# Patient Record
Sex: FEMALE | Race: BLACK OR AFRICAN AMERICAN
[De-identification: names, ages, dates, MRNs, and addresses within clinical notes are randomized per-mention and may not be internally consistent; named-entity substitution may affect disease eponyms.]

---

## 2018-11-08 NOTE — PDOC.LDHP
Labor and Delivery H&P


Chief complaint: other (BP workup)


HPI: 





23 y/o G1 at 27w0d, patient of Dr. Camacho, presents from clinic for PIH workup.

  Patient has CHTN not requiring medications but BPs were higher today than 

they had been.  Denies VB, LOF, ctx, PIH sx, or decreased FM.





ROS neg for HEENT, cv, pulm, gi, gu, neuro, psych, skin, musculoskeletal or 

constitutional symptoms other than mentioned above.


OB History Details: 





First pregnancy


Current pregnancy complications: hypertension, other (clubbed foot)


Past Medical History: 





CHTN, morbid obesity


Current medications: pre- vitamins


Previous surgical history: none


Allergies/Adverse Reactions: 


 Allergies











Allergy/AdvReac Type Severity Reaction Status Date / Time


 


No Known Allergies Allergy   Unverified 18 14:00











Social history: none





- Physical Exam


Abnormal vital signs: mild range BPs with single BP in 160s when initially 

arrived


General: NAD, resting


Lungs: nonlabored breathing


Abdomen: gravid


Extremeties: no edema


FHT: category 1 (140s, mod variability, + accels, no decels)


Gargatha contractions every: none





- OB Labs


Additional Labs: 





 Laboratory Tests











  18





  14:16 14:16 15:10


 


WBC   8.5 


 


RBC   3.72 L 


 


Hgb   10.5 L 


 


Hct   32.9 L 


 


MCV   88.5 


 


MCH   28.3 


 


MCHC   32.0 


 


RDW   13.4 


 


Plt Count   308 


 


MPV   7.4 


 


Neutrophils %   71.3 


 


Lymphocytes %   21.0 


 


Monocytes %   5.7 


 


Eosinophils %   1.1 


 


Basophils %   0.9 


 


Neutrophils #   6.0 


 


Lymphocytes #   1.8 


 


Monocytes #   0.5 


 


Eosinophils #   0.1 


 


Basophils #   0.1 


 


Sodium  134 L  


 


Potassium  4.0  


 


Chloride  105  


 


Carbon Dioxide  24  


 


Anion Gap  9 L  


 


BUN  8  


 


Creatinine  0.64  


 


Estimated GFR (MDRD)  Greater than  90  


 


Glucose  71  


 


Uric Acid  3.9  


 


Calcium  9.2  


 


Total Bilirubin  0.2  


 


AST  19  


 


ALT  20  


 


Alkaline Phosphatase  52  


 


Serum Total Protein  7.1  


 


Albumin  3.8  


 


Globulin  3.3  


 


Albumin/Globulin Ratio  1.2  


 


U Random Total Protein    16 H


 


Urine Creatinine    143.80 H














- Assessment





23 y/o G1 at 27w0d with no e/o Preeclampsia.  Labs wnl.  Fetal status 

reassuring with reactive NST.





- Plan


-: 





D/c home with precautions.  Patient to get BP check tomorrow and call Dr. Camacho

's office to report and schedule an appointment.

## 2018-11-12 NOTE — PDOC.LDPN
Labor & Delivery Progress Note





- Subjective


Subjective: no concerns (No PIH symptoms. Good fetal movement.)





- Objective


Vital signs reviewed and normal: yes


Uterine fundus: non tender


FHT: category 1





- Assessment


(1) Gestational hypertension


Code(s): O13.9 - GESTATIONAL HTN W/O SIGNIFICANT PROTEINURIA, UNSP TRIMESTER   

Current Visit: Yes   Status: Acute   


Qualifiers: 


   Trimester: second trimester   Qualified Code(s): O13.2 - Gestational [

pregnancy-induced] hypertension without significant proteinuria, second 

trimester   


Plan: continue plan of care (Labs and spot urine protein remain normal. NST 

resassurring. Initial blood pressures in early first timester were borderline 

inicating chronic issue. Procardia 30 mg xl intiaited yesterday and blood 

pressures have normalized. Will transfer to floor to complete 24 hour urine 

protein. Administer 12 mg betamethasone this AM and tomorrow in case pre term 

delivery indicated.)

## 2018-11-12 NOTE — HP
DATE OF ADMISSION:  11/11/2018

 

PRIMARY OB:  Dr. Camacho.

 

CHIEF COMPLAINT:  Elevated blood pressures.

 

HISTORY OF PRESENT ILLNESS:  The patient is a 22-year-old G1, P0 female with an intrauterine pregnanc
y at 27 weeks, who is representing to Labor and Delivery with concerns of elevated blood pressure at 
home.  Over the last several days, she has been noted to have elevated pressures in the clinic and ha
s been evaluated here at the hospital 3 days prior for the same complaint.  The patient reports at [facility], she has been getting blood pressures in the 160s with a home monitoring cuff.  She denies headach
e, chest pain, shortness of breath.  She denies abdominal pains.  She denies contractions, vaginal bl
eeding, or urinary symptoms.  In discussing the patient's history, the patient does not have any form
al diagnosis of chronic hypertension, reports that her blood pressures have all been normal up until 
recent history with her pregnancy; however, the patient does also report that she was told she has ha
d elevated blood pressures on occasion when she would give plasma prior to the pregnancy.  She has be
en unable to communicate how high her pressures have been, only that she has been told that they were
 high.

 

PAST MEDICAL HISTORY:  Morbid obesity.

 

ALLERGIES:  No known drug allergies.

 

SOCIAL HISTORY:  Denies drug, alcohol or tobacco use.

 

MEDICATIONS:  Prenatal vitamins.

 

OB LABORATORY DATA:  Unavailable at the time of dictation.

 

REVIEW OF SYSTEMS:  Per HPI.

 

PHYSICAL EXAMINATION:

VITAL SIGNS:  Initial blood pressure on arrival was 138/81.  Patient has had a couple of severe range
 pressures as high as 178/83, pulse in the 80s and 70s, respiratory rate 16-20, temperature 98.2.

GENERAL:  She appears to be in no acute distress.  She is alert and oriented, cooperative and pleasan
t to interact with.

HEAD:  Normocephalic, atraumatic.

LUNGS:  Clear to auscultation bilaterally.

HEART:  Has a regular rate and rhythm.

ABDOMEN:  Soft and obese.

EXTREMITIES:  Nontender, nonedematous.

 

LABORATORY DATA:  Show a white count of 8.7, hemoglobin 10.9, hematocrit 31.1, platelets of 279,000. 
 AST of 16, ALT of 19.  Uric acid of 4.3, creatinine 0.67 and urine to creatinine ratio of less than 
0.1.

 

The fetus shows category 1 tracing with a baseline in the 140s with moderate long-term variability an
d positive 15 x 15 accelerations.

 

ASSESSMENT AND PLAN:  The patient is a 22-year-old female with morbid obesity and possible chronic hy
pertension who presented with elevated blood pressures.  During her initial workup here, the patient 
is noted to have some severe range pressures that may or may not be related to technique and difficul
ty due to her habitus; however, the patient has had repeated mild range pressures and isolated severe
 range pressures during her stay.  The patient has been admitted for prolonged blood pressure monitor
ing, 24-hour urine protein collection and for better understanding of this problem.  Her primary OB, 
Dr. Camacho has been notified of Ms. Chan's current status and will be managing her care.

## 2018-11-13 NOTE — PDOC.EVN
Event Note





- Event Note


Event Note: 





Good fetal movement. No PIH symptoms. 


O: 111-120/67 on procardia 30 mg XL/d


24 hour urine protein was negative 10 mg.


abdomen soft and non tender.


A/P: 27 5/7 weeks--most likely chronic hypertension with superimposed mild 

pih... urine protein was negative. Labs normal.


Continue procardia 30 mg xl/day. Offic echecks twice a week. Labs weekly and 

serial interval growth u/s q 3 weeks. 


Complete betamethasone this AM.

## 2019-01-06 ENCOUNTER — HOSPITAL ENCOUNTER (OUTPATIENT)
Dept: HOSPITAL 92 - L&D/OP | Age: 24
Discharge: HOME | End: 2019-01-06
Attending: OBSTETRICS & GYNECOLOGY
Payer: COMMERCIAL

## 2019-01-06 VITALS — TEMPERATURE: 98.9 F | SYSTOLIC BLOOD PRESSURE: 127 MMHG | DIASTOLIC BLOOD PRESSURE: 82 MMHG

## 2019-01-06 VITALS — BODY MASS INDEX: 63.6 KG/M2

## 2019-01-06 DIAGNOSIS — O99.213: ICD-10-CM

## 2019-01-06 DIAGNOSIS — Z3A.35: ICD-10-CM

## 2019-01-06 DIAGNOSIS — O47.03: Primary | ICD-10-CM

## 2019-01-06 DIAGNOSIS — M54.5: ICD-10-CM

## 2019-01-06 DIAGNOSIS — O10.913: ICD-10-CM

## 2019-01-06 DIAGNOSIS — E66.01: ICD-10-CM

## 2019-01-06 DIAGNOSIS — O99.89: ICD-10-CM

## 2019-01-06 DIAGNOSIS — Z79.899: ICD-10-CM

## 2019-01-06 PROCEDURE — 99283 EMERGENCY DEPT VISIT LOW MDM: CPT

## 2019-01-06 NOTE — PDOC.EVN
Event Note





- Event Note


Event Note: 





S: Feeling well, denies feeling any contractions and tolerating PO hydration 

well.





O: VSS, no elevated BP


Gen: awake, alert, oriented


HEENT: MMM


ABD: gravid, nontender


EXT: no edema





A/P: 22 yo G1 @ 35.3w by LMP/1T sono here with  contractions





1.  contractions


- Spaced out and no longer feeling contractions


- cl/th/hi on exam, has f/u with Dr. Camacho this week


- Return precautions given





2. cHTN


- WNL today, no preE symptoms


- Continue nifedipine





No e/o  labor and BP well controlled, f/u this week with Dr. Camacho





Addendum - Attending





- Attending Attestation


Date/Time: 19





I personally evaluated the patient and discussed the management with Dr. Knapp.


I agree with the Assessment and Plan documented above.

## 2019-01-06 NOTE — PDOC.FPROB
FMR OB H&P: HPI





- History of Present Illness


Chief Complaint: Lower back pain, comes and goes


History of Present Illness: 





Ms. Chan is a 24 yo  at 35.3 by LMP and reported 10w sono who presents 

with cc of low back pain. She reports she was out with friends last night and 

started to notice some intermittent cramping low back pain every 15 minutes or 

so. She went home and laid down and the pain went away. She woke up this 

morning and had 1 more contraction and wanted to come in to get checked out. 

She has been checking her BP multiple times a day and reports it has been 

normal. She denies any headache, vision changes. abdominal pain, swelling, 

vaginal bleeding, LOF. She is feeling baby move regularly.








Primary Care Physician: 





Dr. Camacho





FMR OB H&P: Current Pregnancy





- Prenatal Care


: 1


Para: 0


Gestational age: 35.3


Due date: 2019


Dating Criteria: LMP/10w sono


Course/Complications: 





cHTN, concern for preE earlier in pregnancy s/p BMZ x2 in November


Morbid obesity





- OB Labs


Blood type: unknown


RH: unknown


Antibody Screen: unknown


HIV: unknown


RPR: unknown


HepBsAg: unknown


Quad screen: unknown


Gonorrhea: unknown


Chlamydia: unknown


GBS: unknown





- First Trimester Ultrasound


First trimester: 





Reported 10w





- Anatomy Survey


Anatomy survey: 





WNL per patient, getting frequent growth sonos at Dr. Camacho's office





FMR OB H&P: History





- Past Medical History


PMH: 





cHTN





- OB History


OB History: 





G1





- Surgical History


Sx History: 





Denies





- Social History


Social History: 





Denies t/a/d





- Family History


Family History: 





Denies any history of HTN, DM, pregnancy complications





FMR OB H&P: Medications





- Current


Home Medications: 


 











 Medication  Instructions  Recorded  Confirmed  Type


 


NIFEdipine [Nifedipine ER] 1 capsule PO DAILY 19 History











Allergies/Adverse Reactions: 


 Allergies











Allergy/AdvReac Type Severity Reaction Status Date / Time


 


No Known Allergies Allergy   Unverified 18 14:00














FMR OB H&P: ROS





- Review of Systems


General: denies: fever/chills, weight/appetite/sleep changes


Eyes: denies: eye pain, double vision


ENT: denies: nasal congestion, rhinorrhea


Cardiovascular: denies: edema, orthopnea


Respiratory: denies: cough, congestion


Gastrointestinal: denies: abdominal pain, nausea, vomiting


Genitourinary (Female): denies: dysuria, hematuria


Musculoskeletal: denies: pain, stiffness


Neurologic: denies: syncope, headache


Integumentary: denies: itching, rash


Psychological: denies: depression, anxiety





FMR OB H&P: Vital Signs





- Maternal


Vital signs: 


 Vital Signs - First Documented











Temp Pulse Resp BP


 


 98.9 F   86   18   127/82 


 


 19 13:40  19 13:40  19 13:40  19 13:40














- Fetal Heart Tones


Baseline: 140


Variability: moderate


Acceleration: present


Deceleration: absent


Category: category 1





FMR OB H&P: Physical Exam





- Physical Exam


General: NAD, awake, alert and oriented


HEENT: normocephalic and atraumatic, EOMI


Neck: supple, trachea midline


Heart: RRR, normal S1/S2


General: CTAB, no respiratory distress


Abdomen: soft, gravid


Musculoskeletal: normal gait and station, pulses present


Skin: no rash, good tugor


Psychiatric: intact recent and remote memory, good judgement and insight





- Pelvic Exam


Vulva: normal hair distribution, appropriate carley stage


SVE: closed/thick/high





FMR OB H&P: A/P





- Problem List


(1)  contractions


Status: Acute   Code(s): O47.9 - FALSE LABOR, UNSPECIFIED   





(2) Chronic hypertension affecting pregnancy


Status: Acute   Code(s): O10.919 - UNSP PRE-EXISTING HTN COMP PREGNANCY, UNSP 

TRIMESTER   


Disposition: 





24 yo  at 35.3w by LMP/10w sono here with  contractions without 

cervical change





 1.  contractions


- cl/th/high


- will PO hydrate


- do not suspect  labor but will monitor for 30 minutes to an hour





2. cHTN


- BP all WNL


- No preE symptoms


- Continue daily checks and home meds





Will monitor and likely plan for d/c and f/u with Dr. Camacho this week


Discussion: 


Date/Time: 19 8656





This H&P was discussed with Dr. Celis who agrees with the above documentation 

and plan.








Addendum - Attending





- Attending Attestation


Date/Time: 19 2100





I personally evaluated the patient and discussed the management with Dr. Knapp.


I agree with the History, Examination, Assessment and Plan documented above.

## 2019-01-24 ENCOUNTER — HOSPITAL ENCOUNTER (INPATIENT)
Dept: HOSPITAL 92 - L&D | Age: 24
LOS: 3 days | Discharge: HOME | End: 2019-01-27
Attending: OBSTETRICS & GYNECOLOGY | Admitting: OBSTETRICS & GYNECOLOGY
Payer: COMMERCIAL

## 2019-01-24 VITALS — BODY MASS INDEX: 61.9 KG/M2

## 2019-01-24 DIAGNOSIS — E66.01: ICD-10-CM

## 2019-01-24 DIAGNOSIS — O35.8XX0: ICD-10-CM

## 2019-01-24 DIAGNOSIS — Z3A.38: ICD-10-CM

## 2019-01-24 LAB
ALBUMIN SERPL BCG-MCNC: 3.9 G/DL (ref 3.5–5)
ALP SERPL-CCNC: 105 U/L (ref 40–150)
ALT SERPL W P-5'-P-CCNC: 20 U/L (ref 8–55)
ANION GAP SERPL CALC-SCNC: 15 MMOL/L (ref 10–20)
AST SERPL-CCNC: 17 U/L (ref 5–34)
BILIRUB SERPL-MCNC: 0.3 MG/DL (ref 0.2–1.2)
BUN SERPL-MCNC: 9 MG/DL (ref 7–18.7)
CALCIUM SERPL-MCNC: 10.1 MG/DL (ref 7.8–10.44)
CHLORIDE SERPL-SCNC: 107 MMOL/L (ref 98–107)
CO2 SERPL-SCNC: 19 MMOL/L (ref 22–29)
CREAT CL PREDICTED SERPL C-G-VRATE: 332 ML/MIN (ref 70–130)
GLOBULIN SER CALC-MCNC: 3.7 G/DL (ref 2.4–3.5)
GLUCOSE SERPL-MCNC: 70 MG/DL (ref 70–105)
HBSAG INDEX: 0.32 S/CO (ref 0–0.99)
HGB BLD-MCNC: 11.3 G/DL (ref 12–16)
MCH RBC QN AUTO: 28.4 PG (ref 27–31)
MCV RBC AUTO: 87.6 FL (ref 78–98)
PLATELET # BLD AUTO: 315 THOU/UL (ref 130–400)
POTASSIUM SERPL-SCNC: 4.1 MMOL/L (ref 3.5–5.1)
RBC # BLD AUTO: 3.98 MILL/UL (ref 4.2–5.4)
SODIUM SERPL-SCNC: 137 MMOL/L (ref 136–145)
SYPHILIS ANTIBODY INDEX: 0.03 S/CO
WBC # BLD AUTO: 8.7 THOU/UL (ref 4.8–10.8)

## 2019-01-24 PROCEDURE — 82805 BLOOD GASES W/O2 SATURATION: CPT

## 2019-01-24 PROCEDURE — S0020 INJECTION, BUPIVICAINE HYDRO: HCPCS

## 2019-01-24 PROCEDURE — 86900 BLOOD TYPING SEROLOGIC ABO: CPT

## 2019-01-24 PROCEDURE — 80053 COMPREHEN METABOLIC PANEL: CPT

## 2019-01-24 PROCEDURE — 36415 COLL VENOUS BLD VENIPUNCTURE: CPT

## 2019-01-24 PROCEDURE — 88307 TISSUE EXAM BY PATHOLOGIST: CPT

## 2019-01-24 PROCEDURE — 86780 TREPONEMA PALLIDUM: CPT

## 2019-01-24 PROCEDURE — 87340 HEPATITIS B SURFACE AG IA: CPT

## 2019-01-24 PROCEDURE — 86901 BLOOD TYPING SEROLOGIC RH(D): CPT

## 2019-01-24 PROCEDURE — 85027 COMPLETE CBC AUTOMATED: CPT

## 2019-01-24 PROCEDURE — 51702 INSERT TEMP BLADDER CATH: CPT

## 2019-01-24 PROCEDURE — 86850 RBC ANTIBODY SCREEN: CPT

## 2019-01-24 RX ADMIN — Medication SCH MLS: at 23:01

## 2019-01-24 NOTE — PDOC.LDHP
Labor and Delivery H&P


Chief complaint: scheduled induction


HPI: 





22 Y/O aaf  at 38 weeks -edc 19-by 10 week ultrasound. Has chronic htn 

on procardia 30 mg xl/d since 32 weeks. 


Was doing well until elevation of blood pressures the past 36 hours in the 

range of 150-170/. She denies headache or scotomata or ruq pain. Fetus is 

active.


Urine dip protein at my office was 2+.


sono today EFW 6 1/2 lbs-normal KOREY BPP 8/8...


Current pregnancy also complicated by fetus with bilateral club feet. Otherwise

; fetal anatomy scan normal.


She was treated for chlamydia in early pregnancy-CURT was negative.


Current gestational age (weeks): 38


Due date: 19


Dating criteria: first trimester ultrasound


Current pregnancy complications: hypertension, preeclampsia without severe 

features


Abnormal US findings: Yes (bilateral club feet of fetus.)


Past Medical History: 





none


Current medications: pre- vitamins, other (procardia 30 mg xl/day)


Previous surgical history: none


Allergies/Adverse Reactions: 


 Allergies











Allergy/AdvReac Type Severity Reaction Status Date / Time


 


No Known Allergies Allergy   Unverified 18 14:00











Social history: none





- Physical Exam


Abnormal vital signs: 157/92


General: NAD


Heart: RRR


Lungs: CTAB


Abdomen: NTTP


Extremeties: trace edema


FHT: category 1





- Vaginal Exam


cm dilated: 1


Effacement: 25%


Station: -1





- OB Labs


Blood type: A


RH: positive


Antibody Screen: negative


HIV: negative


RPR: negative


HEPSAg: negative


1 hour GCT: negative


GBS: positive


Urine drug screen: negative


Rubella: immune





- Assessment


L&D Assessment: medically indicated induction





- Plan


Plan: admit to L&D, cervical ripening, labor augmentation if indicated, GBS 

antibiotic prophylaxis (chronic hypertension with superimposed preeclampsia 

morbid obesity group B strep carrier fetal bilateral club feet)

## 2019-01-25 LAB
ANALYZER IN CARDIO: (no result)
BASE EXCESS STD BLDA CALC-SCNC: -8.1 MEQ/L
HCO3 BLDA-SCNC: 23.9 MEQ/L (ref 22–28)

## 2019-01-25 PROCEDURE — 3E033VJ INTRODUCTION OF OTHER HORMONE INTO PERIPHERAL VEIN, PERCUTANEOUS APPROACH: ICD-10-PCS | Performed by: OBSTETRICS & GYNECOLOGY

## 2019-01-25 RX ADMIN — Medication SCH MLS: at 06:59

## 2019-01-25 RX ADMIN — Medication SCH: at 11:29

## 2019-01-25 RX ADMIN — Medication SCH ML: at 01:20

## 2019-01-25 RX ADMIN — Medication SCH MLS: at 02:58

## 2019-01-25 RX ADMIN — Medication SCH: at 11:30

## 2019-01-25 RX ADMIN — Medication SCH: at 11:31

## 2019-01-25 RX ADMIN — Medication SCH ML: at 07:37

## 2019-01-25 NOTE — OP
DATE OF PROCEDURE:  2019



PREOPERATIVE DIAGNOSES:  

1. A 23-year-old  female, G1, P0, at 38 weeks with chronic

hypertension. 

2. Superimposed preeclampsia.

3. Morbid obesity.

4. Failure to progress past 4 cm.



POSTOPERATIVE DIAGNOSES:  

1. A 23-year-old  female, G1, P0, at 38 weeks with chronic

hypertension. 

2. Superimposed preeclampsia.

3. Morbid obesity.

4. Failure to progress past 4 cm.



PROCEDURE PERFORMED:  Primary low transverse  section without extension.



ASSISTANT SURGEON:  Nitza Perez MD



ANESTHESIA:  Epidural.



ESTIMATED BLOOD LOSS:  400 mL.



COMPLICATIONS:  None.



COUNTS:  Correct x2.



ANTIBIOTICS:  2 g Ancef on-call to OR.



FINDINGS:  

1. Male infant, vertex presentation, noted caput with position of the fetal head of

left occiput transverse. 

2. Apgar 6 and 9 with ABG, cord gas of 7.065.

3. Clear urine present in Valero catheter postprocedure.

4. Normal-appearing bilateral fallopian tubes and ovaries and uterus notable for a

3.5 x 3.5 cm subserosal left anterior uterine fibroid. 

5. Clear amniotic fluid noted next.



DISPOSITION:  To recovery room.



DESCRIPTION OF OPERATIVE PROCEDURE:  The patient previously received informed

consent in regard to surgery.  She was taken back to the operating room, where she

received an adequate dosing of her epidural for operative procedure.  She was

prepped and draped in the usual sterile fashion, but prior to this, her pannus had

been taped up bilaterally to expose the lower abdomen for operative incision point.

A Pfannenstiel incision was made in the usual fashion.  At this time, it was carried

down the fascia.  The fascia was nicked in the midline.  Fascial incision was

extended bilaterally using curved White scissors.  The rectus fascia was then

extended bilaterally and then it was dissected off the rectus muscle bellies

superiorly and inferiorly.  The rectus muscle bellies were divided in the midline.

Peritoneal cavity was then entered and the peritoneal incision was extended.  An

extra large Bob O retractor was then placed.  A bladder flap was created in usual

fashion and a 2 cm hysterotomy incision was made in the lower uterine segment.  This

was extended via finger fractionation and the baby was delivered in vertex

presentation, LOT presentation noted.  The mouth and nares of the infant were bulb

suctioned on the abdomen.  The cord was doubly clamped and cut, and handed to the

pediatric team in attendance.  Usual cord blood and cord gas was obtained.  Placenta

was manually extracted.  Uterus was then externalized and cleared of any remaining

placental fragments with a dry laparotomy sponge.  The uterus was then placed back

into the abdominal cavity.  Hysterotomy incision was inspected and no extensions

were noted.  Hysterotomy incision was then closed in running locking #1 Monocryl

with good hemostasis confirmed.  The pelvis was irrigated and suctioned.  Hemostasis

along the hysterotomy site line was confirmed.  The Bob O retractor was removed.

The rectus muscle bellies were then inspected and noted to be hemostatic 

prior to fascial closure.  The fascia was closed with 0 PDS suture x2 in a running

continuous fashion.  Subcutaneous tissue was irrigated and noted to be hemostatic

prior to approximation with 3-0 plain gut suture in running continuous fashion.  The

skin was then closed with staples.  The surgery was terminated.  No anesthetic or

surgical complications occurred. 





Job ID:  [id number]

## 2019-01-25 NOTE — PDOC.OPDEL
OB Operative/Delivery Note


Delivery Dr/Surgeon: Janice


Assist: Ana


Pre-Delivery Diagnosis: arrest of dilation, other (medically indicated 

induction for chtn with superimposed pih)


Procedure/Post Delivery Dx: primary low transverse CS


Anesthesia: epidural





- Findings


  ** A


Sex: male


Weight: 6 lb 1 oz


Apgar - 1 min: 6


Apgar - 5 min: 9





- Additional Findings/Plan


Placenta delivered: manual removal


 findings: low transverse hysterotomy without extension


Estimated blood loss: 400ml


Post delivery plan: routine recovery

## 2019-01-25 NOTE — OP
DATE OF PROCEDURE:  2019



ADDENDUM:  I was present and scrubbed to assist the uncomplicated primary 

with Dr. Krystle Camacho.  Please see her note for full details. 







Job ID:  [id number]

## 2019-01-26 LAB
HGB BLD-MCNC: 9.3 G/DL (ref 12–16)
MCH RBC QN AUTO: 29.1 PG (ref 27–31)
MCV RBC AUTO: 88.7 FL (ref 78–98)
PLATELET # BLD AUTO: 252 THOU/UL (ref 130–400)
RBC # BLD AUTO: 3.18 MILL/UL (ref 4.2–5.4)
WBC # BLD AUTO: 9.7 THOU/UL (ref 4.8–10.8)

## 2019-01-26 RX ADMIN — Medication SCH ML: at 09:02

## 2019-01-26 RX ADMIN — Medication SCH: at 22:56

## 2019-01-26 RX ADMIN — DOCUSATE CALCIUM SCH MG: 240 CAPSULE, LIQUID FILLED ORAL at 22:55

## 2019-01-26 RX ADMIN — Medication SCH: at 22:57

## 2019-01-26 RX ADMIN — DOCUSATE CALCIUM SCH MG: 240 CAPSULE, LIQUID FILLED ORAL at 08:57

## 2019-01-26 RX ADMIN — DOCUSATE CALCIUM SCH: 240 CAPSULE, LIQUID FILLED ORAL at 22:55

## 2019-01-26 RX ADMIN — NIFEDIPINE SCH MG: 30 TABLET, FILM COATED, EXTENDED RELEASE ORAL at 08:57

## 2019-01-26 NOTE — PDOC.PP
Post Partum Progress Note


Post Partum Day #: 1


Subjective: 





Patient doing well this AM. No significant overnight events. Patient states she 

has not been up to ambulate yet. She still has barnard in place. She is 

tolerating PO and passing flatus. 


PO intake tolerated: yes


Flatus: yes


Ambulation: no


 Vital Signs (12 hours)











  Temp Pulse Resp BP Pulse Ox


 


 19 02:00  98.0 F  69  18  137/74 


 


 19 20:00  98.2 F  64  18  128/60  99


 


 19 17:10  97.6 F  55 L  18  137/67  98








 Weight











Weight                         163.747 kg

















- Physical Examination


General: NAD


Cardiovascular: no m/r/g, RRR


Respiratory: clear to auscultation bilaterally, non-labored breathing


Abdominal: + bowel sounds, lochia (minimal), no distention, appropriately TTP


Fundus firm & at: below umbilicus


Extremities: negative homans (B)


Skin: CS incision dry & intact, no rash


Neurological: no gross focal deficits


Psychiatric: A&Ox3, normal affect


Result Diagrams: 


 19 05:08





 19 14:58


Additional Labs: 


 Post Partum Labs











Blood Type  A POSITIVE   19  14:57    


 


Hep Bs Antigen  Non-Reactive S/CO (NonReactive)   19  14:57    











(1) S/P primary low transverse 


Code(s): Z98.891 - HISTORY OF UTERINE SCAR FROM PREVIOUS SURGERY   Status: 

Acute   





(2) Arrest of dilation, delivered, current hospitalization


Code(s): O62.1 - SECONDARY UTERINE INERTIA   Status: Acute   





(3) Chronic hypertension with superimposed preeclampsia


Code(s): O11.9 - PRE-EXISTING HYPERTENSION WITH PRE-ECLAMPSIA, UNSP TRIMESTER   

Status: Acute   





(4) Morbid obesity


Code(s): E66.01 - MORBID (SEVERE) OBESITY DUE TO EXCESS CALORIES   Status: 

Chronic   





(5) Group B streptococcal carriage complicating pregnancy


Code(s): O99.820 - STREPTOCOCCUS B CARRIER STATE COMPLICATING PREGNANCY   Status

: Acute   





- Assessment/Plan





23 year old  at 38 wks delivered TAGA infant via pLTCS





s/p pLTCS for arrest of dilation 


- POD #1


- Routine PP care


- Encourage ambulation


- Remove barnard catheter


- Incision covered with bandage which is clean, dry, intact





Chronic HTN with superimposed pre-E


- BP has been well controlled PP


- Patient asymptomatic (denies headaches, vision changes, RUQ pain, swelling)





Term pregnancy, delivered


- See plan as above





GBS positive


- Received ppx 


- pLTCS





Dispo. Stable. Encourage ambulation. POD#1. Patient will stay another day.

## 2019-01-27 VITALS — DIASTOLIC BLOOD PRESSURE: 81 MMHG | TEMPERATURE: 98.8 F | SYSTOLIC BLOOD PRESSURE: 146 MMHG

## 2019-01-27 RX ADMIN — Medication SCH: at 09:36

## 2019-01-27 RX ADMIN — NIFEDIPINE SCH MG: 30 TABLET, FILM COATED, EXTENDED RELEASE ORAL at 09:31

## 2019-01-27 RX ADMIN — DOCUSATE CALCIUM SCH MG: 240 CAPSULE, LIQUID FILLED ORAL at 09:31

## 2019-01-27 NOTE — DIS
DATE OF ADMISSION:  2019



DATE OF DISCHARGE:  2019



ADMITTING DIAGNOSES:  

1. Intrauterine pregnancy at 38 weeks.

2. Morbid obesity.

3. Chronic hypertension, superimposed preeclampsia.

4. Group B strep.

5. Fetal bilateral clubbed feet.



DISCHARGE DIAGNOSES:  

1. Intrauterine pregnancy at 38 weeks.

2. Morbid obesity.

3. Chronic hypertension and superimposed preeclampsia.

4. Group B strep.

5. Fetal bilateral clubbed feet.

6. Arrest of labor.



PROCEDURE PERFORMED:  Primary  for arrest of labor.



CONSULTATIONS:  None.



HOSPITAL COURSE:  The patient is a 23-year-old female, who presented at 38 weeks

gestation for induction of labor secondary to chronic hypertension and superimposed

preeclampsia.  Her induction resulted in arrest of labor and Dr. Camacho took her for

a primary .  For complete details, please refer to the operative note.  The

patient's postoperative course here has been uncomplicated.  After delivery, blood

pressures returned to the normal with isolated mild range pressures.  Today is

postoperative day 2, she is feeling well and has expressed interest in discharge

home.  The patient reports she is ambulating, tolerating p.o., voiding on her own,

and having good pain control.  She does report that her left foot when walking feels

tingly. 



PHYSICAL EXAMINATION:

VITAL SIGNS:  Blood pressure 132/72, respiratory rate 18, pulse 89, and temperature

98.3.  Her blood pressure max in the last 24 hours is 144/68. 

GENERAL:  The patient appears to be in no acute distress.  She is alert, oriented,

cooperative, and pleasant to interact with. 

HEAD:  Normocephalic and atraumatic. 

LUNGS:  Clear to auscultation bilaterally. 

HEART:  She has regular rate and rhythm. 

ABDOMEN:  Soft.  Incision is clean, dry, and intact with staples. 

EXTREMITIES:  Nontender and nonedematous.



LABORATORY DATA:  Her hemoglobin dropped from 11.3 to 9.3 postoperative, hematocrit

34.8 to 28.2, and platelets 315,000 to 252,000. 



The patient will be discharged to home.  She has instructions to follow up with Dr. Camacho in 1 week for blood pressure check and for staple removal.  The patient has

instructions to seek medical attention should she experience fever, increasing pain

or bleeding, as she has incision redness or drainage, or uncontrolled pain. 



The patient will be discharged home with ibuprofen and Tylenol No. 3 as needed for

pain. 







Job ID:  [id number]

## 2019-12-13 ENCOUNTER — HOSPITAL ENCOUNTER (OUTPATIENT)
Dept: HOSPITAL 57 - BURRAD | Age: 24
Discharge: HOME | End: 2019-12-13
Attending: NURSE PRACTITIONER
Payer: COMMERCIAL

## 2019-12-13 DIAGNOSIS — M79.671: Primary | ICD-10-CM

## 2019-12-13 NOTE — RAD
RIGHT FOOT THREE VIEWS:

12/13/19

 

No fracture or periosteal was seen. The bones and joints currently appear normal. 

 

IMPRESSION: 

No acute finding. 

 

POS: HOME

## 2020-11-12 ENCOUNTER — HOSPITAL ENCOUNTER (EMERGENCY)
Dept: HOSPITAL 57 - BURERS | Age: 25
Discharge: HOME | End: 2020-11-12
Payer: COMMERCIAL

## 2020-11-12 DIAGNOSIS — Z79.899: ICD-10-CM

## 2020-11-12 DIAGNOSIS — E78.5: ICD-10-CM

## 2020-11-12 DIAGNOSIS — U07.1: Primary | ICD-10-CM

## 2020-11-12 DIAGNOSIS — I10: ICD-10-CM

## 2020-11-12 PROCEDURE — 99283 EMERGENCY DEPT VISIT LOW MDM: CPT

## 2020-11-12 PROCEDURE — U0003 INFECTIOUS AGENT DETECTION BY NUCLEIC ACID (DNA OR RNA); SEVERE ACUTE RESPIRATORY SYNDROME CORONAVIRUS 2 (SARS-COV-2) (CORONAVIRUS DISEASE [COVID-19]), AMPLIFIED PROBE TECHNIQUE, MAKING USE OF HIGH THROUGHPUT TECHNOLOGIES AS DESCRIBED BY CMS-2020-01-R: HCPCS

## 2020-11-12 PROCEDURE — 87635 SARS-COV-2 COVID-19 AMP PRB: CPT

## 2020-11-13 LAB — SARS-COV-2 RNA RESP QL NAA+PROBE: DETECTED

## 2020-11-15 ENCOUNTER — HOSPITAL ENCOUNTER (EMERGENCY)
Dept: HOSPITAL 57 - BURERS | Age: 25
Discharge: HOME | End: 2020-11-15
Payer: SELF-PAY

## 2020-11-15 DIAGNOSIS — E78.5: ICD-10-CM

## 2020-11-15 DIAGNOSIS — R10.9: Primary | ICD-10-CM

## 2020-11-15 DIAGNOSIS — R10.813: ICD-10-CM

## 2020-11-15 DIAGNOSIS — Z79.899: ICD-10-CM

## 2020-11-15 DIAGNOSIS — I10: ICD-10-CM

## 2020-11-15 LAB
ALBUMIN SERPL BCG-MCNC: 4.3 G/DL (ref 3.5–5)
ALP SERPL-CCNC: 68 U/L (ref 40–110)
ALT SERPL W P-5'-P-CCNC: 25 U/L (ref 8–55)
ANION GAP SERPL CALC-SCNC: 18 MMOL/L (ref 10–20)
AST SERPL-CCNC: 18 U/L (ref 5–34)
BACTERIA UR QL AUTO: (no result) HPF
BASOPHILS # BLD AUTO: 0 THOU/UL (ref 0–0.2)
BASOPHILS NFR BLD AUTO: 0.6 % (ref 0–1)
BILIRUB SERPL-MCNC: 0.2 MG/DL (ref 0.2–1.2)
BUN SERPL-MCNC: 12 MG/DL (ref 7–18.7)
CALCIUM SERPL-MCNC: 9.1 MG/DL (ref 7.8–10.44)
CHLORIDE SERPL-SCNC: 104 MMOL/L (ref 98–107)
CO2 SERPL-SCNC: 24 MMOL/L (ref 22–29)
CREAT CL PREDICTED SERPL C-G-VRATE: 0 ML/MIN (ref 70–130)
EOSINOPHIL # BLD AUTO: 0 THOU/UL (ref 0–0.7)
EOSINOPHIL NFR BLD AUTO: 0.6 % (ref 0–10)
GLOBULIN SER CALC-MCNC: 3.4 G/DL (ref 2.4–3.5)
GLUCOSE SERPL-MCNC: 116 MG/DL (ref 70–105)
HGB BLD-MCNC: 12.3 G/DL (ref 12–16)
LYMPHOCYTES # BLD AUTO: 2.2 THOU/UL (ref 1.2–3.4)
LYMPHOCYTES NFR BLD AUTO: 29.9 % (ref 21–51)
MCH RBC QN AUTO: 26.5 PG (ref 27–31)
MCV RBC AUTO: 86.9 FL (ref 78–98)
MONOCYTES # BLD AUTO: 0.4 THOU/UL (ref 0.11–0.59)
MONOCYTES NFR BLD AUTO: 5.9 % (ref 0–10)
NEUTROPHILS # BLD AUTO: 4.7 THOU/UL (ref 1.4–6.5)
NEUTROPHILS NFR BLD AUTO: 63 % (ref 42–75)
PLATELET # BLD AUTO: 268 THOU/UL (ref 130–400)
POTASSIUM SERPL-SCNC: 4 MMOL/L (ref 3.5–5.1)
PREGU CONTROL BACKGROUND?: (no result)
PREGU CONTROL BAR APPEAR?: YES
PROT UR STRIP.AUTO-MCNC: 30 MG/DL
RBC # BLD AUTO: 4.66 MILL/UL (ref 4.2–5.4)
RBC UR QL AUTO: (no result) HPF (ref 0–3)
SODIUM SERPL-SCNC: 142 MMOL/L (ref 136–145)
SP GR UR STRIP: 1.03 (ref 1–1.04)
WBC # BLD AUTO: 7.4 THOU/UL (ref 4.8–10.8)
WBC UR QL AUTO: (no result) HPF (ref 0–3)

## 2020-11-15 PROCEDURE — 74176 CT ABD & PELVIS W/O CONTRAST: CPT

## 2020-11-15 PROCEDURE — 80053 COMPREHEN METABOLIC PANEL: CPT

## 2020-11-15 PROCEDURE — 87086 URINE CULTURE/COLONY COUNT: CPT

## 2020-11-15 PROCEDURE — 81015 MICROSCOPIC EXAM OF URINE: CPT

## 2020-11-15 PROCEDURE — 81025 URINE PREGNANCY TEST: CPT

## 2020-11-15 PROCEDURE — 36415 COLL VENOUS BLD VENIPUNCTURE: CPT

## 2020-11-15 PROCEDURE — 85025 COMPLETE CBC W/AUTO DIFF WBC: CPT

## 2020-11-15 PROCEDURE — 96372 THER/PROPH/DIAG INJ SC/IM: CPT

## 2020-11-15 PROCEDURE — 81003 URINALYSIS AUTO W/O SCOPE: CPT

## 2020-11-15 NOTE — CT
CT ABDOMEN AND PELVIS WITHOUT CONTRAST:

 

Date: 11-

 

IV access could not be gain after several attempts, so the exam was done without IV contrast. 

 

FINDINGS: 

The lung bases show no effusion or major infiltrate, however, there is at least one small ground glas
s patch in the right lower lobe consistent with the known diagnosis of Covid. There may be a minimal 
second area in the lingula on the left. Overall, the lung bases are mostly clear. 

 

The liver, spleen, pancreas, gallbladder, adrenal glands and abdominal aorta were unremarkable in jesús
earance within the limitations of a noncontrast study. 

 

The right kidney is larger than left, almost appearing somewhat swollen in comparison. It is hard to 
assess if there is true hydronephrosis on this noncontrast study, but the ureter does not appear to b
e dilated. No renal or ureteral stones were appreciated on the right, though the sensitivity of this 
study is rather low due to the body habitus. There is a tiny nonobstructing stone in the lower pole o
f the left kidney. 

 

The bowel shows no dilation or wall thickening. There is no inflammatory change around bowel, and no 
free air or free fluid was seen. The appendix was identified and appears normal in size and appearanc
e. 

 

CT of the pelvis showed a 4 cm rim calcified mass in the midline that appears to be associated with t
he uterus. I presume it is a uterine fibroid in the process of calcifying. There is no free fluid or 
inflammatory change in the pelvis. There is some streaking in the soft tissues in the low back at abo
ut the L3-4 level that is presumably long-standing and not part of the current problem. 

 

 

IMPRESSION: 

1.      Apparent swelling of the right kidney compared to the left. Ultrasound could be helpful. Cons
ider recently passed stone versus pyelonephritis. 

2.      Nonobstructing calculus in the lower pole of the left kidney. 

3.      One or two very small patchy ground glass areas in the lung bases consistent with recent Covi
d diagnosis. 

4.      4 cm rim calcified pelvic mass in the midline, most likely a calcified uterine fibroid. 

5.      Appendix appears normal. 

 

Findings discussed with Dr. Patrick at 2106 on 11-.

 

POS: HOME

## 2022-12-08 ENCOUNTER — HOSPITAL ENCOUNTER (EMERGENCY)
Dept: HOSPITAL 57 - BURERS | Age: 27
Discharge: HOME | End: 2022-12-08
Payer: COMMERCIAL

## 2022-12-08 DIAGNOSIS — I10: ICD-10-CM

## 2022-12-08 DIAGNOSIS — R10.13: Primary | ICD-10-CM

## 2022-12-08 LAB
ALBUMIN SERPL BCG-MCNC: 4.3 G/DL (ref 3.5–5)
ALP SERPL-CCNC: 41 U/L (ref 40–110)
ALT SERPL W P-5'-P-CCNC: 19 U/L (ref 8–55)
ANION GAP SERPL CALC-SCNC: 14 MMOL/L (ref 10–20)
AST SERPL-CCNC: 21 U/L (ref 5–34)
BASOPHILS # BLD AUTO: 0.1 THOU/UL (ref 0–0.2)
BASOPHILS NFR BLD AUTO: 1.2 % (ref 0–1)
BILIRUB SERPL-MCNC: 0.2 MG/DL (ref 0.2–1.2)
BUN SERPL-MCNC: 13 MG/DL (ref 7–18.7)
CALCIUM SERPL-MCNC: 10 MG/DL (ref 7.8–10.44)
CHLORIDE SERPL-SCNC: 104 MMOL/L (ref 98–107)
CO2 SERPL-SCNC: 22 MMOL/L (ref 22–29)
CREAT CL PREDICTED SERPL C-G-VRATE: 0 ML/MIN (ref 70–130)
EOSINOPHIL # BLD AUTO: 0.1 THOU/UL (ref 0–0.7)
EOSINOPHIL NFR BLD AUTO: 1 % (ref 0–10)
GLOBULIN SER CALC-MCNC: 3.7 G/DL (ref 2.4–3.5)
GLUCOSE SERPL-MCNC: 78 MG/DL (ref 70–105)
HGB BLD-MCNC: 12 G/DL (ref 12–16)
LIPASE SERPL-CCNC: 36 U/L (ref 8–78)
LYMPHOCYTES # BLD AUTO: 3 THOU/UL (ref 1.2–3.4)
LYMPHOCYTES NFR BLD AUTO: 36.5 % (ref 21–51)
MCH RBC QN AUTO: 27.8 PG (ref 27–31)
MCV RBC AUTO: 87 FL (ref 78–98)
MONOCYTES # BLD AUTO: 0.6 THOU/UL (ref 0.11–0.59)
MONOCYTES NFR BLD AUTO: 7 % (ref 0–10)
NEUTROPHILS # BLD AUTO: 4.4 THOU/UL (ref 1.4–6.5)
NEUTROPHILS NFR BLD AUTO: 54.4 % (ref 42–75)
PLATELET # BLD AUTO: 315 10X3/UL (ref 130–400)
POTASSIUM SERPL-SCNC: 4.4 MMOL/L (ref 3.5–5.1)
RBC # BLD AUTO: 4.32 MILL/UL (ref 4.2–5.4)
SODIUM SERPL-SCNC: 136 MMOL/L (ref 136–145)
WBC # BLD AUTO: 8.2 10X3/UL (ref 4.8–10.8)

## 2022-12-08 PROCEDURE — 36415 COLL VENOUS BLD VENIPUNCTURE: CPT

## 2022-12-08 PROCEDURE — 85025 COMPLETE CBC W/AUTO DIFF WBC: CPT

## 2022-12-08 PROCEDURE — 99283 EMERGENCY DEPT VISIT LOW MDM: CPT

## 2022-12-08 PROCEDURE — 83690 ASSAY OF LIPASE: CPT

## 2022-12-08 PROCEDURE — 80053 COMPREHEN METABOLIC PANEL: CPT
